# Patient Record
Sex: FEMALE | Race: OTHER | URBAN - METROPOLITAN AREA
[De-identification: names, ages, dates, MRNs, and addresses within clinical notes are randomized per-mention and may not be internally consistent; named-entity substitution may affect disease eponyms.]

---

## 2020-04-05 ENCOUNTER — INPATIENT (INPATIENT)
Facility: HOSPITAL | Age: 65
LOS: 5 days | Discharge: ANOTHER HOSPITAL | End: 2020-04-11
Attending: INTERNAL MEDICINE | Admitting: INTERNAL MEDICINE
Payer: MEDICAID

## 2020-04-05 VITALS
DIASTOLIC BLOOD PRESSURE: 95 MMHG | HEART RATE: 121 BPM | HEIGHT: 72 IN | SYSTOLIC BLOOD PRESSURE: 151 MMHG | OXYGEN SATURATION: 82 % | RESPIRATION RATE: 34 BRPM | WEIGHT: 225.09 LBS | TEMPERATURE: 99 F

## 2020-04-05 DIAGNOSIS — Z29.9 ENCOUNTER FOR PROPHYLACTIC MEASURES, UNSPECIFIED: ICD-10-CM

## 2020-04-05 DIAGNOSIS — J18.9 PNEUMONIA, UNSPECIFIED ORGANISM: ICD-10-CM

## 2020-04-05 LAB
ALBUMIN SERPL ELPH-MCNC: 3.1 G/DL — LOW (ref 3.3–5)
ALP SERPL-CCNC: 89 U/L — SIGNIFICANT CHANGE UP (ref 40–120)
ALT FLD-CCNC: 26 U/L — SIGNIFICANT CHANGE UP (ref 12–78)
ANION GAP SERPL CALC-SCNC: 8 MMOL/L — SIGNIFICANT CHANGE UP (ref 5–17)
APTT BLD: 26.1 SEC — LOW (ref 28.5–37)
AST SERPL-CCNC: 31 U/L — SIGNIFICANT CHANGE UP (ref 15–37)
BASE EXCESS BLDA CALC-SCNC: 0.6 MMOL/L — SIGNIFICANT CHANGE UP (ref -2–2)
BASOPHILS # BLD AUTO: 0.01 K/UL — SIGNIFICANT CHANGE UP (ref 0–0.2)
BASOPHILS NFR BLD AUTO: 0.1 % — SIGNIFICANT CHANGE UP (ref 0–2)
BILIRUB SERPL-MCNC: 0.6 MG/DL — SIGNIFICANT CHANGE UP (ref 0.2–1.2)
BLOOD GAS COMMENTS: SIGNIFICANT CHANGE UP
BLOOD GAS COMMENTS: SIGNIFICANT CHANGE UP
BLOOD GAS SOURCE: SIGNIFICANT CHANGE UP
BUN SERPL-MCNC: 8 MG/DL — SIGNIFICANT CHANGE UP (ref 7–23)
CALCIUM SERPL-MCNC: 8.1 MG/DL — LOW (ref 8.5–10.1)
CHLORIDE SERPL-SCNC: 104 MMOL/L — SIGNIFICANT CHANGE UP (ref 96–108)
CK SERPL-CCNC: 51 U/L — SIGNIFICANT CHANGE UP (ref 26–192)
CO2 SERPL-SCNC: 26 MMOL/L — SIGNIFICANT CHANGE UP (ref 22–31)
CREAT SERPL-MCNC: 0.91 MG/DL — SIGNIFICANT CHANGE UP (ref 0.5–1.3)
D DIMER BLD IA.RAPID-MCNC: 1018 NG/ML DDU — HIGH
EOSINOPHIL # BLD AUTO: 0 K/UL — SIGNIFICANT CHANGE UP (ref 0–0.5)
EOSINOPHIL NFR BLD AUTO: 0 % — SIGNIFICANT CHANGE UP (ref 0–6)
FIBRINOGEN PPP-MCNC: 476 MG/DL — SIGNIFICANT CHANGE UP (ref 350–510)
GLUCOSE SERPL-MCNC: 134 MG/DL — HIGH (ref 70–99)
HCO3 BLDA-SCNC: 24 MMOL/L — SIGNIFICANT CHANGE UP (ref 21–29)
HCT VFR BLD CALC: 45.7 % — HIGH (ref 34.5–45)
HGB BLD-MCNC: 14.9 G/DL — SIGNIFICANT CHANGE UP (ref 11.5–15.5)
HOROWITZ INDEX BLDA+IHG-RTO: 100 — SIGNIFICANT CHANGE UP
IMM GRANULOCYTES NFR BLD AUTO: 0.5 % — SIGNIFICANT CHANGE UP (ref 0–1.5)
INR BLD: 1.16 RATIO — SIGNIFICANT CHANGE UP (ref 0.88–1.16)
LACTATE SERPL-SCNC: 1.6 MMOL/L — SIGNIFICANT CHANGE UP (ref 0.7–2)
LYMPHOCYTES # BLD AUTO: 0.98 K/UL — LOW (ref 1–3.3)
LYMPHOCYTES # BLD AUTO: 11.2 % — LOW (ref 13–44)
MCHC RBC-ENTMCNC: 28.2 PG — SIGNIFICANT CHANGE UP (ref 27–34)
MCHC RBC-ENTMCNC: 32.6 GM/DL — SIGNIFICANT CHANGE UP (ref 32–36)
MCV RBC AUTO: 86.4 FL — SIGNIFICANT CHANGE UP (ref 80–100)
MONOCYTES # BLD AUTO: 0.66 K/UL — SIGNIFICANT CHANGE UP (ref 0–0.9)
MONOCYTES NFR BLD AUTO: 7.5 % — SIGNIFICANT CHANGE UP (ref 2–14)
NEUTROPHILS # BLD AUTO: 7.09 K/UL — SIGNIFICANT CHANGE UP (ref 1.8–7.4)
NEUTROPHILS NFR BLD AUTO: 80.7 % — HIGH (ref 43–77)
NRBC # BLD: 0 /100 WBCS — SIGNIFICANT CHANGE UP (ref 0–0)
PCO2 BLDA: 37 MMHG — SIGNIFICANT CHANGE UP (ref 32–46)
PH BLD: 7.43 — SIGNIFICANT CHANGE UP (ref 7.35–7.45)
PLATELET # BLD AUTO: 212 K/UL — SIGNIFICANT CHANGE UP (ref 150–400)
PO2 BLDA: 229 MMHG — HIGH (ref 74–108)
POTASSIUM SERPL-MCNC: 4.1 MMOL/L — SIGNIFICANT CHANGE UP (ref 3.5–5.3)
POTASSIUM SERPL-SCNC: 4.1 MMOL/L — SIGNIFICANT CHANGE UP (ref 3.5–5.3)
PROT SERPL-MCNC: 7.5 GM/DL — SIGNIFICANT CHANGE UP (ref 6–8.3)
PROTHROM AB SERPL-ACNC: 13.1 SEC — HIGH (ref 10–12.9)
RBC # BLD: 5.29 M/UL — HIGH (ref 3.8–5.2)
RBC # FLD: 13.5 % — SIGNIFICANT CHANGE UP (ref 10.3–14.5)
SAO2 % BLDA: 99 % — HIGH (ref 92–96)
SODIUM SERPL-SCNC: 138 MMOL/L — SIGNIFICANT CHANGE UP (ref 135–145)
WBC # BLD: 8.78 K/UL — SIGNIFICANT CHANGE UP (ref 3.8–10.5)
WBC # FLD AUTO: 8.78 K/UL — SIGNIFICANT CHANGE UP (ref 3.8–10.5)

## 2020-04-05 PROCEDURE — 71045 X-RAY EXAM CHEST 1 VIEW: CPT | Mod: 26

## 2020-04-05 PROCEDURE — 99222 1ST HOSP IP/OBS MODERATE 55: CPT

## 2020-04-05 PROCEDURE — 93010 ELECTROCARDIOGRAM REPORT: CPT

## 2020-04-05 PROCEDURE — 99285 EMERGENCY DEPT VISIT HI MDM: CPT

## 2020-04-05 RX ORDER — HYDROXYCHLOROQUINE SULFATE 200 MG
TABLET ORAL
Refills: 0 | Status: COMPLETED | OUTPATIENT
Start: 2020-04-06 | End: 2020-04-10

## 2020-04-05 RX ORDER — HYDROXYCHLOROQUINE SULFATE 200 MG
200 TABLET ORAL EVERY 12 HOURS
Refills: 0 | Status: COMPLETED | OUTPATIENT
Start: 2020-04-06 | End: 2020-04-10

## 2020-04-05 RX ORDER — CEFTRIAXONE 500 MG/1
1000 INJECTION, POWDER, FOR SOLUTION INTRAMUSCULAR; INTRAVENOUS ONCE
Refills: 0 | Status: COMPLETED | OUTPATIENT
Start: 2020-04-05 | End: 2020-04-05

## 2020-04-05 RX ORDER — ACETAMINOPHEN 500 MG
650 TABLET ORAL EVERY 4 HOURS
Refills: 0 | Status: DISCONTINUED | OUTPATIENT
Start: 2020-04-05 | End: 2020-04-11

## 2020-04-05 RX ORDER — ACETAMINOPHEN 500 MG
1000 TABLET ORAL ONCE
Refills: 0 | Status: COMPLETED | OUTPATIENT
Start: 2020-04-05 | End: 2020-04-05

## 2020-04-05 RX ORDER — HYDROXYCHLOROQUINE SULFATE 200 MG
400 TABLET ORAL EVERY 12 HOURS
Refills: 0 | Status: COMPLETED | OUTPATIENT
Start: 2020-04-05 | End: 2020-04-06

## 2020-04-05 RX ORDER — AZITHROMYCIN 500 MG/1
500 TABLET, FILM COATED ORAL ONCE
Refills: 0 | Status: COMPLETED | OUTPATIENT
Start: 2020-04-05 | End: 2020-04-05

## 2020-04-05 RX ORDER — ENOXAPARIN SODIUM 100 MG/ML
40 INJECTION SUBCUTANEOUS DAILY
Refills: 0 | Status: DISCONTINUED | OUTPATIENT
Start: 2020-04-05 | End: 2020-04-06

## 2020-04-05 RX ADMIN — Medication 1000 MILLIGRAM(S): at 23:06

## 2020-04-05 RX ADMIN — CEFTRIAXONE 100 MILLIGRAM(S): 500 INJECTION, POWDER, FOR SOLUTION INTRAMUSCULAR; INTRAVENOUS at 23:13

## 2020-04-05 RX ADMIN — AZITHROMYCIN 500 MILLIGRAM(S): 500 TABLET, FILM COATED ORAL at 23:13

## 2020-04-05 RX ADMIN — Medication 400 MILLIGRAM(S): at 22:03

## 2020-04-05 NOTE — ED PROVIDER NOTE - PHYSICAL EXAMINATION
Gen: Awake, coughing, speaking in short sentences  Head: NC, AT, EOMI, normal lids/conjunctiva  ENT: normal hearing, patent oropharynx  Neck: supple, no tenderness/meningismus, FROM, Trachea midline  Pulm: deferred, COVID PUI  CV: RRR, +dist pulses  Abd: soft, no TTP, no guarding/rebound tenderness  Mskel: no edema/erythema/cyanosis  Skin: no rash  Neuro: no sensory/motor deficits

## 2020-04-05 NOTE — ED ADULT NURSE NOTE - OBJECTIVE STATEMENT
Received patient in bed 24. AOX4 and [place] speaking to this RN. C/o difficulty breathing with diarrhea X1 week. NRB in place with POX 99%. Awaiting results for dispo

## 2020-04-05 NOTE — H&P ADULT - HISTORY OF PRESENT ILLNESS
Patient is a 65F with no PMH who presents to the ED for dyspnea.  Patient admitted for evaluation for COVID19.  Has had symptoms for   2 weeks.  Symptoms include fever, chills, cough, generalized   weakness and increased dyspnea.  Cough has been productive of    clear sputum.  Denies nausea, vomiting, diarrhea.  Denies prior   testing for COVID19 and denies recent exposure to anyone with   known COVID19.  Nonsmoker, NKDA.  Febrile to101.5 in ED.  Labs   benign.  CXR shows bilateral opacities.  Currently 95% on 15L O2 via   nonrebreather.  Will admit to floor for PNA.

## 2020-04-05 NOTE — ED ADULT TRIAGE NOTE - CHIEF COMPLAINT QUOTE
per daughter, pt  c/o fever, cough, back pain and difficulty breathiing x 1 week per daughter, pt  c/o fever, cough, back pain and difficulty breathing x 1 week

## 2020-04-05 NOTE — ED PROVIDER NOTE - OBJECTIVE STATEMENT
Pertinent PMH/PSH/FHx/SHx and Review of Systems contained within:    66yo F w no significant PMH, s/p appendectomy 3/11/20, presents to ED for eval of SOB, cough & fever.  Pt states she has been feeling sick for about 2wks, then worse over the last week.  Sat 80s RA Pertinent PMH/PSH/FHx/SHx and Review of Systems contained within:    66yo F w no significant PMH, s/p appendectomy 3/11/20, presents to ED for eval of SOB, cough & fever.  Pt states she has been feeling sick for about 2wks, then worse over the last week.  Sat 80s RA.  +diarrhea, dizziness weakness.  No sick contacts at home.    +fever/chills, No photophobia/eye pain/changes in vision, No ear pain/sore throat/dysphagia, +chest pain, +SOB/cough, No abdominal pain, No neck/back pain, no rash, no changes in neurological status/function.

## 2020-04-05 NOTE — H&P ADULT - NSHPPHYSICALEXAM_GEN_ALL_CORE
Physical exam:  General: patient in no acute distress, resting comfortably  Head:  Atraumatic, Normocephalic  Eyes: EOMI, PERRLA, clear sclera  Neck: Supple, thyroid nontender, non enlarged  Cardio: S1/S2 +ve, regular rate and rhythm, no M/G/R  Resp: mild bilateral rales   GI: abdomen soft, nontender, non distended, no guarding, BS +ve x 4  Ext: no significant pedal edema  Neuro: CN 2-12 intact, no significant motor or sensory deficits.  Skin: No rashes or lesions

## 2020-04-06 LAB
CRP SERPL-MCNC: 5.64 MG/DL — HIGH (ref 0–0.4)
CRP SERPL-MCNC: 5.8 MG/DL — HIGH (ref 0–0.4)
D DIMER BLD IA.RAPID-MCNC: 1537 NG/ML DDU — HIGH
FERRITIN SERPL-MCNC: 516 NG/ML — HIGH (ref 15–150)
HCV AB S/CO SERPL IA: 0.18 S/CO — SIGNIFICANT CHANGE UP (ref 0–0.99)
HCV AB SERPL-IMP: SIGNIFICANT CHANGE UP
LDH SERPL L TO P-CCNC: 386 U/L — HIGH (ref 50–242)
LDH SERPL L TO P-CCNC: 414 U/L — HIGH (ref 50–242)
PROCALCITONIN SERPL-MCNC: 0.06 NG/ML — SIGNIFICANT CHANGE UP (ref 0.02–0.1)
SARS-COV-2 RNA SPEC QL NAA+PROBE: DETECTED

## 2020-04-06 PROCEDURE — 99233 SBSQ HOSP IP/OBS HIGH 50: CPT

## 2020-04-06 RX ORDER — ATAZANAVIR 200 MG/1
300 CAPSULE ORAL DAILY
Refills: 0 | Status: DISCONTINUED | OUTPATIENT
Start: 2020-04-07 | End: 2020-04-10

## 2020-04-06 RX ORDER — ENOXAPARIN SODIUM 100 MG/ML
30 INJECTION SUBCUTANEOUS EVERY 12 HOURS
Refills: 0 | Status: DISCONTINUED | OUTPATIENT
Start: 2020-04-06 | End: 2020-04-07

## 2020-04-06 RX ORDER — ATAZANAVIR 200 MG/1
300 CAPSULE ORAL DAILY
Refills: 0 | Status: DISCONTINUED | OUTPATIENT
Start: 2020-04-06 | End: 2020-04-06

## 2020-04-06 RX ADMIN — Medication 40 MILLIGRAM(S): at 17:46

## 2020-04-06 RX ADMIN — Medication 40 MILLIGRAM(S): at 05:42

## 2020-04-06 RX ADMIN — Medication 400 MILLIGRAM(S): at 00:38

## 2020-04-06 RX ADMIN — ENOXAPARIN SODIUM 30 MILLIGRAM(S): 100 INJECTION SUBCUTANEOUS at 17:46

## 2020-04-06 RX ADMIN — ATAZANAVIR 300 MILLIGRAM(S): 200 CAPSULE ORAL at 17:45

## 2020-04-06 RX ADMIN — Medication 40 MILLIGRAM(S): at 00:38

## 2020-04-06 RX ADMIN — ENOXAPARIN SODIUM 30 MILLIGRAM(S): 100 INJECTION SUBCUTANEOUS at 05:42

## 2020-04-06 RX ADMIN — Medication 400 MILLIGRAM(S): at 05:42

## 2020-04-06 RX ADMIN — CEFTRIAXONE 1000 MILLIGRAM(S): 500 INJECTION, POWDER, FOR SOLUTION INTRAMUSCULAR; INTRAVENOUS at 00:27

## 2020-04-06 RX ADMIN — Medication 200 MILLIGRAM(S): at 17:45

## 2020-04-06 NOTE — PROGRESS NOTE ADULT - ASSESSMENT
Patient was presented with cough, fever, shortness of breath and achiness. Patient was diagnosed with acute hypoxic respiratory failure with acute COVID-19 infection and bilateral pneumonia. Patient was admitted and was started on plaquenil and iv solumedrol. Patient was placed on %.     1.	Acute hypoxic respiratory failue with acute COVID-19 bilateral pneumonia. cont NRB oxygen. start plaquenil. Follow inflammatory markers. cont iv solumedrol. check oxygen sat with continuous pulse ox. EKG reviewed by me showed QTc 453 msec.   2.	Obesity. outpatient PCP follow up. at risk for DM type 2.     Lovenox  Full code

## 2020-04-06 NOTE — PROGRESS NOTE ADULT - SUBJECTIVE AND OBJECTIVE BOX
CHIEF COMPLAINT: + SOB and requiring 100% NRB oxygen   + achiness   + cough and no diarrhea       PHYSICAL EXAM:    GENERAL: obese, on NRB oxygen   CHEST/LUNG: few crackles bibasally, no wheezing, no crackles   HEART: Regular rate and rhythm; No murmurs, rubs  ABDOMEN: Soft, Nontender, Nondistended; Bowel sounds present  EXTREMITIES:  Moving all four extremities spontaneously, No clubbing, cyanosis, or edema  NERVOUS SYSTEM:  Grossly non focal.  Psychiatry: AAO x 3, mood is appropriate       OBJECTIVE DATA:   Vital Signs Last 24 Hrs  T(C): 36.1 (06 Apr 2020 13:27), Max: 38.6 (05 Apr 2020 21:17)  T(F): 97 (06 Apr 2020 13:27), Max: 101.5 (05 Apr 2020 21:17)  HR: 90 (06 Apr 2020 13:27) (90 - 121)  BP: 132/80 (06 Apr 2020 13:27) (121/61 - 155/92)  BP(mean): --  RR: 24 (06 Apr 2020 13:27) (18 - 34)  SpO2: 99% (06 Apr 2020 13:27) (82% - 100%)           Daily Height in cm: 157.48 (06 Apr 2020 06:27)    Daily   LABS:                        14.9   8.78  )-----------( 212      ( 05 Apr 2020 22:02 )             45.7             04-05    138  |  104  |  8   ----------------------------<  134<H>  4.1   |  26  |  0.91    Ca    8.1<L>      05 Apr 2020 22:02    TPro  7.5  /  Alb  3.1<L>  /  TBili  0.6  /  DBili  x   /  AST  31  /  ALT  26  /  AlkPhos  89  04-05              PT/INR - ( 05 Apr 2020 22:02 )   PT: 13.1 sec;   INR: 1.16 ratio         PTT - ( 05 Apr 2020 22:02 )  PTT:26.1 sec     ABG - ( 05 Apr 2020 22:43 )  pH, Arterial: x     pH, Blood: 7.43  /  pCO2: 37    /  pO2: 229   / HCO3: 24    / Base Excess: 0.6   /  SaO2: 99               CARDIAC MARKERS ( 05 Apr 2020 22:02 )  x     / x     / 51 U/L / x     / x             Interval Radiology studies: reviewed by me  < from: Xray Chest 1 View- PORTABLE-Urgent (04.05.20 @ 22:47) >  Bilateral airspace opacities suspicious for pneumonia.      MEDICATIONS  (STANDING):  enoxaparin Injectable 30 milliGRAM(s) SubCutaneous every 12 hours  hydroxychloroquine   Oral   hydroxychloroquine 200 milliGRAM(s) Oral every 12 hours  methylPREDNISolone sodium succinate Injectable 40 milliGRAM(s) IV Push every 12 hours    MEDICATIONS  (PRN):  acetaminophen   Tablet .. 650 milliGRAM(s) Oral every 4 hours PRN Temp greater or equal to 38.5C (101.3F)

## 2020-04-07 LAB
CRP SERPL-MCNC: 4.02 MG/DL — HIGH (ref 0–0.4)
FERRITIN SERPL-MCNC: 442 NG/ML — HIGH (ref 15–150)
FERRITIN SERPL-MCNC: 480 NG/ML — HIGH (ref 15–150)
LDH SERPL L TO P-CCNC: 321 U/L — HIGH (ref 50–242)

## 2020-04-07 PROCEDURE — 99233 SBSQ HOSP IP/OBS HIGH 50: CPT

## 2020-04-07 PROCEDURE — 93010 ELECTROCARDIOGRAM REPORT: CPT

## 2020-04-07 RX ORDER — ENOXAPARIN SODIUM 100 MG/ML
40 INJECTION SUBCUTANEOUS EVERY 12 HOURS
Refills: 0 | Status: DISCONTINUED | OUTPATIENT
Start: 2020-04-07 | End: 2020-04-11

## 2020-04-07 RX ORDER — PANTOPRAZOLE SODIUM 20 MG/1
40 TABLET, DELAYED RELEASE ORAL
Refills: 0 | Status: DISCONTINUED | OUTPATIENT
Start: 2020-04-07 | End: 2020-04-11

## 2020-04-07 RX ADMIN — Medication 200 MILLIGRAM(S): at 05:09

## 2020-04-07 RX ADMIN — Medication 40 MILLIGRAM(S): at 05:09

## 2020-04-07 RX ADMIN — Medication 200 MILLIGRAM(S): at 17:29

## 2020-04-07 RX ADMIN — ENOXAPARIN SODIUM 40 MILLIGRAM(S): 100 INJECTION SUBCUTANEOUS at 17:29

## 2020-04-07 RX ADMIN — Medication 40 MILLIGRAM(S): at 17:28

## 2020-04-07 RX ADMIN — ENOXAPARIN SODIUM 30 MILLIGRAM(S): 100 INJECTION SUBCUTANEOUS at 05:08

## 2020-04-07 NOTE — PROGRESS NOTE ADULT - SUBJECTIVE AND OBJECTIVE BOX
CHIEF COMPLAINT: Hypoxic on nasal canula oxygen and placed on NRB again   + achiness         PHYSICAL EXAM:    GENERAL: obese, on NRB oxygen   CHEST/LUNG: few crackles bibasally, no wheezing, no crackles   HEART: Regular rate and rhythm; No murmurs, rubs  ABDOMEN: Soft, Nontender, Nondistended; Bowel sounds present  EXTREMITIES:  Moving all four extremities spontaneously, No clubbing, cyanosis, or edema  NERVOUS SYSTEM:  Grossly non focal.  Psychiatry: AAO x 3, mood is appropriate       OBJECTIVE DATA:     Vital Signs Last 24 Hrs  T(C): 35.9 (07 Apr 2020 05:20), Max: 36.8 (06 Apr 2020 17:41)  T(F): 96.7 (07 Apr 2020 05:20), Max: 98.2 (06 Apr 2020 17:41)  HR: 82 (07 Apr 2020 05:20) (82 - 90)  BP: 118/77 (07 Apr 2020 05:20) (118/77 - 132/80)  BP(mean): --  RR: 18 (07 Apr 2020 05:20) (18 - 24)  SpO2: 99% (07 Apr 2020 05:20) (97% - 99%)           Daily     Daily   LABS:                        14.9   8.78  )-----------( 212      ( 05 Apr 2020 22:02 )             45.7             04-05    138  |  104  |  8   ----------------------------<  134<H>  4.1   |  26  |  0.91    Ca    8.1<L>      05 Apr 2020 22:02    TPro  7.5  /  Alb  3.1<L>  /  TBili  0.6  /  DBili  x   /  AST  31  /  ALT  26  /  AlkPhos  89  04-05              PT/INR - ( 05 Apr 2020 22:02 )   PT: 13.1 sec;   INR: 1.16 ratio         PTT - ( 05 Apr 2020 22:02 )  PTT:26.1 sec    ABG - ( 05 Apr 2020 22:43 )  pH, Arterial: x     pH, Blood: 7.43  /  pCO2: 37    /  pO2: 229   / HCO3: 24    / Base Excess: 0.6   /  SaO2: 99               CARDIAC MARKERS ( 05 Apr 2020 22:02 )  x     / x     / 51 U/L / x     / x          CAPILLARY BLOOD GLUCOSE          Culture - Blood (collected 04-06)  Source: .Blood Blood  Preliminary Report (04-07):    No growth to date.    Culture - Blood (collected 04-06)  Source: .Blood Blood  Preliminary Report (04-07):    No growth to date.      MEDICATIONS  (STANDING):  atazanavir 300 milliGRAM(s) Oral daily  enoxaparin Injectable 40 milliGRAM(s) SubCutaneous every 12 hours  hydroxychloroquine   Oral   hydroxychloroquine 200 milliGRAM(s) Oral every 12 hours  methylPREDNISolone sodium succinate Injectable 40 milliGRAM(s) IV Push every 12 hours    MEDICATIONS  (PRN):  acetaminophen   Tablet .. 650 milliGRAM(s) Oral every 4 hours PRN Temp greater or equal to 38.5C (101.3F)

## 2020-04-07 NOTE — PROGRESS NOTE ADULT - ASSESSMENT
Patient was presented with cough, fever, shortness of breath and achiness. Patient was diagnosed with acute hypoxic respiratory failure with acute COVID-19 infection and bilateral pneumonia. Patient was admitted and was started on plaquenil and iv solumedrol. Patient was placed on %.     1.	Acute hypoxic respiratory failue with acute COVID-19 bilateral pneumonia. still hypoxic requiring NRB oxygen. Cont plaquenil. Follow inflammatory markers. cont iv solumedrol. check oxygen sat with continuous pulse ox. discussed with nurse and patient's family on phone.   2.	Obesity. outpatient PCP follow up. At risk for DM type 2.     Lovenox  Full code       Time spent by me 37 mins

## 2020-04-08 LAB
CRP SERPL-MCNC: 1.88 MG/DL — HIGH (ref 0–0.4)
FERRITIN SERPL-MCNC: 431 NG/ML — HIGH (ref 15–150)
LDH SERPL L TO P-CCNC: 347 U/L — HIGH (ref 50–242)

## 2020-04-08 PROCEDURE — 99233 SBSQ HOSP IP/OBS HIGH 50: CPT

## 2020-04-08 RX ADMIN — Medication 40 MILLIGRAM(S): at 17:18

## 2020-04-08 RX ADMIN — ENOXAPARIN SODIUM 40 MILLIGRAM(S): 100 INJECTION SUBCUTANEOUS at 17:18

## 2020-04-08 RX ADMIN — PANTOPRAZOLE SODIUM 40 MILLIGRAM(S): 20 TABLET, DELAYED RELEASE ORAL at 06:24

## 2020-04-08 RX ADMIN — ENOXAPARIN SODIUM 40 MILLIGRAM(S): 100 INJECTION SUBCUTANEOUS at 05:21

## 2020-04-08 RX ADMIN — Medication 200 MILLIGRAM(S): at 17:18

## 2020-04-08 RX ADMIN — ATAZANAVIR 300 MILLIGRAM(S): 200 CAPSULE ORAL at 11:53

## 2020-04-08 RX ADMIN — Medication 200 MILLIGRAM(S): at 05:21

## 2020-04-08 RX ADMIN — Medication 40 MILLIGRAM(S): at 05:21

## 2020-04-08 NOTE — PROGRESS NOTE ADULT - ASSESSMENT
Patient was presented with cough, fever, shortness of breath and achiness. Patient was diagnosed with acute hypoxic respiratory failure with acute COVID-19 infection and bilateral pneumonia. Patient was admitted and was started on plaquenil and iv solumedrol. Patient was placed on %.     1.	Acute hypoxic respiratory failue with acute COVID-19 bilateral pneumonia. still hypoxic requiring NRB oxygen but work of breathing improving. eating well Cont plaquenil. add reyataz.  Follow inflammatory markers. cont iv solumedrol.       Lovenox  Full code

## 2020-04-08 NOTE — PROGRESS NOTE ADULT - SUBJECTIVE AND OBJECTIVE BOX
Patient is a 65y old  Female who presents with a chief complaint of dyspnea (07 Apr 2020 09:57)      INTERVAL HPI/OVERNIGHT EVENTS: work of breathing improving but still on NRB. no events     MEDICATIONS  (STANDING):  atazanavir 300 milliGRAM(s) Oral daily  enoxaparin Injectable 40 milliGRAM(s) SubCutaneous every 12 hours  hydroxychloroquine   Oral   hydroxychloroquine 200 milliGRAM(s) Oral every 12 hours  methylPREDNISolone sodium succinate Injectable 40 milliGRAM(s) IV Push every 12 hours  pantoprazole    Tablet 40 milliGRAM(s) Oral before breakfast    MEDICATIONS  (PRN):  acetaminophen   Tablet .. 650 milliGRAM(s) Oral every 4 hours PRN Temp greater or equal to 38.5C (101.3F)      Allergies    No Known Allergies    Intolerances      Vital Signs Last 24 Hrs  T(C): 37.1 (08 Apr 2020 12:06), Max: 37.1 (08 Apr 2020 12:06)  T(F): 98.8 (08 Apr 2020 12:06), Max: 98.8 (08 Apr 2020 12:06)  HR: 78 (08 Apr 2020 12:06) (77 - 90)  BP: 128/69 (08 Apr 2020 12:06) (126/80 - 147/79)  BP(mean): --  RR: 20 (08 Apr 2020 12:06) (18 - 20)  SpO2: 96% (08 Apr 2020 12:06) (96% - 100%)    PHYSICAL EXAM:  GENERAL: NAD, well-groomed, well-developed  HEAD:  Atraumatic, Normocephalic  EYES: EOMI, PERRLA, conjunctiva and sclera clear  ENMT: No tonsillar erythema, exudates, or enlargement; Moist mucous membranes, Good dentition, No lesions  NECK: Supple, No JVD, Normal thyroid  NERVOUS SYSTEM:  Alert & Oriented X3, Good concentration; Motor Strength 5/5 B/L upper and lower extremities; DTRs 2+ intact and symmetric  CHEST/LUNG: Clear to percussion bilaterally; No rales, rhonchi, wheezing, or rubs  HEART: Regular rate and rhythm; No murmurs, rubs, or gallops  ABDOMEN: Soft, Nontender, Nondistended; Bowel sounds present  EXTREMITIES:  2+ Peripheral Pulses, No clubbing, cyanosis, or edema  LYMPH: No lymphadenopathy noted  SKIN: No rashes or lesions    LABS:              CAPILLARY BLOOD GLUCOSE          RADIOLOGY & ADDITIONAL TESTS:    Imaging Personally Reviewed:  [ ] YES  [ ] NO    Consultant(s) Notes Reviewed:  [ ] YES  [ ] NO    Care Discussed with Consultants/Other Providers [ ] YES  [ ] NO

## 2020-04-09 LAB
ANION GAP SERPL CALC-SCNC: 7 MMOL/L — SIGNIFICANT CHANGE UP (ref 5–17)
BUN SERPL-MCNC: 26 MG/DL — HIGH (ref 7–23)
CALCIUM SERPL-MCNC: 8.3 MG/DL — LOW (ref 8.5–10.1)
CHLORIDE SERPL-SCNC: 103 MMOL/L — SIGNIFICANT CHANGE UP (ref 96–108)
CO2 SERPL-SCNC: 27 MMOL/L — SIGNIFICANT CHANGE UP (ref 22–31)
CREAT SERPL-MCNC: 0.76 MG/DL — SIGNIFICANT CHANGE UP (ref 0.5–1.3)
CRP SERPL-MCNC: 0.91 MG/DL — HIGH (ref 0–0.4)
D DIMER BLD IA.RAPID-MCNC: 1438 NG/ML DDU — HIGH
FERRITIN SERPL-MCNC: 485 NG/ML — HIGH (ref 15–150)
GLUCOSE SERPL-MCNC: 189 MG/DL — HIGH (ref 70–99)
HCT VFR BLD CALC: 43.6 % — SIGNIFICANT CHANGE UP (ref 34.5–45)
HGB BLD-MCNC: 14.3 G/DL — SIGNIFICANT CHANGE UP (ref 11.5–15.5)
LDH SERPL L TO P-CCNC: 604 U/L — HIGH (ref 50–242)
MCHC RBC-ENTMCNC: 28.4 PG — SIGNIFICANT CHANGE UP (ref 27–34)
MCHC RBC-ENTMCNC: 32.8 GM/DL — SIGNIFICANT CHANGE UP (ref 32–36)
MCV RBC AUTO: 86.7 FL — SIGNIFICANT CHANGE UP (ref 80–100)
NRBC # BLD: 0 /100 WBCS — SIGNIFICANT CHANGE UP (ref 0–0)
PLATELET # BLD AUTO: 256 K/UL — SIGNIFICANT CHANGE UP (ref 150–400)
POTASSIUM SERPL-MCNC: 4.2 MMOL/L — SIGNIFICANT CHANGE UP (ref 3.5–5.3)
POTASSIUM SERPL-SCNC: 4.2 MMOL/L — SIGNIFICANT CHANGE UP (ref 3.5–5.3)
RBC # BLD: 5.03 M/UL — SIGNIFICANT CHANGE UP (ref 3.8–5.2)
RBC # FLD: 12.7 % — SIGNIFICANT CHANGE UP (ref 10.3–14.5)
SODIUM SERPL-SCNC: 137 MMOL/L — SIGNIFICANT CHANGE UP (ref 135–145)
WBC # BLD: 11.05 K/UL — HIGH (ref 3.8–10.5)
WBC # FLD AUTO: 11.05 K/UL — HIGH (ref 3.8–10.5)

## 2020-04-09 PROCEDURE — 99232 SBSQ HOSP IP/OBS MODERATE 35: CPT

## 2020-04-09 RX ADMIN — PANTOPRAZOLE SODIUM 40 MILLIGRAM(S): 20 TABLET, DELAYED RELEASE ORAL at 05:48

## 2020-04-09 RX ADMIN — ENOXAPARIN SODIUM 40 MILLIGRAM(S): 100 INJECTION SUBCUTANEOUS at 17:16

## 2020-04-09 RX ADMIN — Medication 200 MILLIGRAM(S): at 17:16

## 2020-04-09 RX ADMIN — Medication 40 MILLIGRAM(S): at 05:48

## 2020-04-09 RX ADMIN — Medication 200 MILLIGRAM(S): at 05:48

## 2020-04-09 RX ADMIN — ATAZANAVIR 300 MILLIGRAM(S): 200 CAPSULE ORAL at 12:56

## 2020-04-09 RX ADMIN — ENOXAPARIN SODIUM 40 MILLIGRAM(S): 100 INJECTION SUBCUTANEOUS at 05:47

## 2020-04-09 NOTE — DISCHARGE NOTE PROVIDER - NSDCCPCAREPLAN_GEN_ALL_CORE_FT
PRINCIPAL DISCHARGE DIAGNOSIS  Diagnosis: Pneumonia  Assessment and Plan of Treatment: You were tested positive for COVID- 19.  For up to date information information on COVID -19, you may contact the Department of Health Hotline at [phone]. Tylenol 650mg tablet twice a day for fever and you can also apply cool compress for fever. You can take Aleve 2 pills twice a day with food for bodyaches .  Perform frequent good hand hygiene by washing your hands with soap and hot water as can tolerate but not to burn your hands.  Disinfect your house doorknobs, steering wheels, refrigerator door handles.  If your symptoms worsen, fever, chills, chest pain or sob, call your doctor.  Call 911 for severe shortness of breath or get to your nearest Emergency Department and inform the staff or 911 that you were tested positive for COVID-19.  Continue to self quarantine and wear a mask so as to not infect others.  Drink lots of fluids.        SECONDARY DISCHARGE DIAGNOSES  Diagnosis: Hypoxia  Assessment and Plan of Treatment: Make sure to follow up with your doctor.  Report any new symptoms of increasing shortness of breath or chest pain to your doctor.  Report to your nearest Emergency Department or call 911 if worsening shortness of breath and inform 911 that your were tested Positive for COVID-19.

## 2020-04-09 NOTE — PROGRESS NOTE ADULT - SUBJECTIVE AND OBJECTIVE BOX
INTERVAL HPI/OVERNIGHT EVENTS:  Pt seen and examined at bedside.     Allergies/Intolerance: No Known Allergies      MEDICATIONS  (STANDING):  atazanavir 300 milliGRAM(s) Oral daily  enoxaparin Injectable 40 milliGRAM(s) SubCutaneous every 12 hours  hydroxychloroquine   Oral   hydroxychloroquine 200 milliGRAM(s) Oral every 12 hours  methylPREDNISolone sodium succinate Injectable 40 milliGRAM(s) IV Push every 12 hours  pantoprazole    Tablet 40 milliGRAM(s) Oral before breakfast    MEDICATIONS  (PRN):  acetaminophen   Tablet .. 650 milliGRAM(s) Oral every 4 hours PRN Temp greater or equal to 38.5C (101.3F)        ROS: all systems reviewed and wnl      PHYSICAL EXAMINATION:  Vital Signs Last 24 Hrs  T(C): 36.6 (09 Apr 2020 05:35), Max: 37.1 (08 Apr 2020 12:06)  T(F): 97.9 (09 Apr 2020 05:35), Max: 98.8 (08 Apr 2020 12:06)  HR: 71 (09 Apr 2020 05:35) (71 - 97)  BP: 154/90 (09 Apr 2020 05:35) (128/69 - 163/93)  BP(mean): --  RR: 18 (09 Apr 2020 05:35) (18 - 20)  SpO2: 98% (09 Apr 2020 05:35) (94% - 98%)  CAPILLARY BLOOD GLUCOSE            GENERAL:   NECK: supple, No JVD  CHEST/LUNG: clear to auscultation bilaterally; no rales, rhonchi, or wheezing b/l  HEART: normal S1, S2  ABDOMEN: BS+, soft, ND, NT   EXTREMITIES:  pulses palpable; no clubbing, cyanosis, or edema b/l LEs  SKIN: no rashes or lesions      LABS:                        14.3   11.05 )-----------( 256      ( 09 Apr 2020 08:19 )             43.6     04-09    137  |  103  |  26<H>  ----------------------------<  189<H>  4.2   |  27  |  0.76    Ca    8.3<L>      09 Apr 2020 08:19 INTERVAL HPI/OVERNIGHT EVENTS:  Pt seen and examined at bedside.     Allergies/Intolerance: No Known Allergies      MEDICATIONS  (STANDING):  atazanavir 300 milliGRAM(s) Oral daily  enoxaparin Injectable 40 milliGRAM(s) SubCutaneous every 12 hours  hydroxychloroquine   Oral   hydroxychloroquine 200 milliGRAM(s) Oral every 12 hours  methylPREDNISolone sodium succinate Injectable 40 milliGRAM(s) IV Push every 12 hours  pantoprazole    Tablet 40 milliGRAM(s) Oral before breakfast    MEDICATIONS  (PRN):  acetaminophen   Tablet .. 650 milliGRAM(s) Oral every 4 hours PRN Temp greater or equal to 38.5C (101.3F)        ROS: all systems reviewed and wnl      PHYSICAL EXAMINATION:  Vital Signs Last 24 Hrs  T(C): 36.6 (09 Apr 2020 05:35), Max: 37.1 (08 Apr 2020 12:06)  T(F): 97.9 (09 Apr 2020 05:35), Max: 98.8 (08 Apr 2020 12:06)  HR: 71 (09 Apr 2020 05:35) (71 - 97)  BP: 154/90 (09 Apr 2020 05:35) (128/69 - 163/93)  BP(mean): --  RR: 18 (09 Apr 2020 05:35) (18 - 20)  SpO2: 98% (09 Apr 2020 05:35) (94% - 98%)  CAPILLARY BLOOD GLUCOSE            GENERAL: comfortable on 100 % NRB, no CP or SOB  NECK: supple, No JVD  CHEST/LUNG: clear to auscultation bilaterally; no rales, rhonchi, or wheezing b/l  HEART: normal S1, S2  ABDOMEN: BS+, soft, ND, NT   EXTREMITIES:  pulses palpable; no clubbing, cyanosis, or edema b/l LEs  SKIN: no rashes or lesions      LABS:                        14.3   11.05 )-----------( 256      ( 09 Apr 2020 08:19 )             43.6     04-09    137  |  103  |  26<H>  ----------------------------<  189<H>  4.2   |  27  |  0.76    Ca    8.3<L>      09 Apr 2020 08:19

## 2020-04-09 NOTE — DISCHARGE NOTE PROVIDER - CARE PROVIDER_API CALL
Nathaniel Soto)  Internal Medicine  [address] [place] [address]  Phone: [phone]  Fax: [phone]  Follow Up Time:

## 2020-04-09 NOTE — DISCHARGE NOTE PROVIDER - HOSPITAL COURSE
Patient is a 65 female no sign pmhx presented with cough, fever, shortness of breath and achiness. Patient was diagnosed with acute hypoxic respiratory failure with acute COVID-19 infection and bilateral pneumonia.   Patient was subsequently admitted and treated with Plaquenil, iv solumedrol and  Reyataz 300mg daily for 4 days.          Pt remained stable and will slowly wean of NR.  Lovenox DVT prevention 40 mg bid. Patient is a 65 female no sign pmhx presented with cough, fever, shortness of breath and achiness. Patient was diagnosed with acute hypoxic respiratory failure with acute COVID-19 infection and bilateral pneumonia.   Patient was subsequently admitted and treated with Plaquenil, iv solumedrol and  Reyataz 300mg daily for 4 days.          Pt remained stable and will slowly wean of NR.  Lovenox DVT prevention 40 mg bid. Discharge to [facility] on 100% NRB.         Ferritin, Serum (04.10.20 @ 13:47)      Ferritin, Serum: 511 ng/mL    C-Reactive Protein, Serum (04.10.20 @ 13:47)      C-Reactive Protein, Serum: 0.50 mg/dL    Lactate Dehydrogenase, Serum (04.10.20 @ 13:44)      Lactate Dehydrogenase, Serum: 398 U/L    Complete Blood Count in AM (04.09.20 @ 08:19)      Nucleated RBC: 0 /100 WBCs      WBC Count: 11.05 K/uL      RBC Count: 5.03 M/uL      Hemoglobin: 14.3 g/dL      Hematocrit: 43.6 %      Mean Cell Volume: 86.7 fl      Mean Cell Hemoglobin: 28.4 pg      Mean Cell Hemoglobin Conc: 32.8 gm/dL      Red Cell Distrib Width: 12.7 %      Platelet Count - Automated: 256 K/uL    Basic Metabolic Panel in AM (04.09.20 @ 08:19)      Sodium, Serum: 137 mmol/L      Potassium, Serum: 4.2 mmol/L      Chloride, Serum: 103 mmol/L      Carbon Dioxide, Serum: 27 mmol/L      Anion Gap, Serum: 7 mmol/L      Blood Urea Nitrogen, Serum: 26 mg/dL      Creatinine, Serum: 0.76 mg/dL      Glucose, Serum: 189 mg/dL      Calcium, Total Serum: 8.3 mg/dL      eGFR if Non : 83: Interpretative comment    The units for eGFR are mL/min/1.73M2 (normalized body surface area). The    eGFR is calculated from a serum creatinine using the CKD-EPI equation.    Other variables required for calculation are race, age and sex. Among    patients with chronic kidney disease (CKD), the eGFR is useful in    determining the stage of disease according to KDOQI CKD classification.    All eGFR results are reported numerically with the following    interpretation.            GFR                    With                 Without       (ml/min/1.73 m2)    Kidney Damage       Kidney Damage          >= 90                    Stage 1                     Normal          60-89                    Stage 2                     Decreased GFR          30-59     Stage 3                     Stage 3          15-29                    Stage 4                     Stage 4          < 15                      Stage 5                     Stage 5    Each stage of CKD assumes that the associated GFR level has been in    effect for at least 3 months. Determination of stages one and two (with    eGFR > 59 ml/min/m2) requires estimation of kidney damage for at least 3    months as defined by structural or functional abnormalities.    Limitations: All estimates of GFR will be less accurate for patients at    extremes of muscle mass (including but not limited to frail elderly,    critically ill, or cancer patients), those with unusual diets, and those    with conditions associated with reduced secretion or extrarenal    elimination of creatinine. The eGFR equation is not recommended for use    in patients with unstable creatinine levels. mL/min/1.73M2      eGFR if : 96 mL/min/1.73M2 Patient is a 65 female no sign pmhx presented with cough, fever, shortness of breath and achiness. Patient was diagnosed with acute hypoxic respiratory failure with acute COVID-19 infection and bilateral pneumonia.   Patient was subsequently admitted and treated with Plaquenil, iv solumedrol and  Reyataz 300mg daily for 4 days.              Ferritin, Serum (04.10.20 @ 13:47)      Ferritin, Serum: 511 ng/mL    C-Reactive Protein, Serum (04.10.20 @ 13:47)      C-Reactive Protein, Serum: 0.50 mg/dL    Lactate Dehydrogenase, Serum (04.10.20 @ 13:44)      Lactate Dehydrogenase, Serum: 398 U/L    Complete Blood Count in AM (04.09.20 @ 08:19)      Nucleated RBC: 0 /100 WBCs      WBC Count: 11.05 K/uL      RBC Count: 5.03 M/uL      Hemoglobin: 14.3 g/dL      Hematocrit: 43.6 %      Mean Cell Volume: 86.7 fl      Mean Cell Hemoglobin: 28.4 pg      Mean Cell Hemoglobin Conc: 32.8 gm/dL      Red Cell Distrib Width: 12.7 %      Platelet Count - Automated: 256 K/uL    Basic Metabolic Panel in AM (04.09.20 @ 08:19)      Sodium, Serum: 137 mmol/L      Potassium, Serum: 4.2 mmol/L      Chloride, Serum: 103 mmol/L      Carbon Dioxide, Serum: 27 mmol/L      Anion Gap, Serum: 7 mmol/L      Blood Urea Nitrogen, Serum: 26 mg/dL      Creatinine, Serum: 0.76 mg/dL      Glucose, Serum: 189 mg/dL      Calcium, Total Serum: 8.3 mg/dL      eGFR if Non : 83: Interpretative comment                Pt remained stable and will slowly wean of NR.  Lovenox DVT prevention 40 mg bid.     Patient was placed on %.         1.	Acute hypoxic respiratory failue with acute COVID-19 bilateral pneumonia. Slowly improving. Eating well. Completed Plaquenil and Reyataz. Completed IV  Solumedrol.      2.	    3.	Slowly try to wean off 100 % NRB. Out of bed.  Lovenox DVT prevention 40 mg bid.  Full code.          Discharge to [facility] on 100% NRB.     Discharge home when fully off oxygen.

## 2020-04-09 NOTE — PROGRESS NOTE ADULT - ASSESSMENT
Patient was presented with cough, fever, shortness of breath and achiness. Patient was diagnosed with acute hypoxic respiratory failure with acute COVID-19 infection and bilateral pneumonia. Patient was admitted and was started on plaquenil and iv solumedrol. Patient was placed on %.     1.	Acute hypoxic respiratory failue with acute COVID-19 bilateral pneumonia. still hypoxic requiring NRB oxygen but work of breathing improving. eating well Cont plaquenil. add reyataz.  Follow inflammatory markers. cont iv solumedrol.       Lovenox  Full code Patient was presented with cough, fever, shortness of breath and achiness. Patient was diagnosed with acute hypoxic respiratory failure with acute COVID-19 infection and bilateral pneumonia. Patient was admitted and was started on plaquenil and iv solumedrol. Patient was placed on %.     1.	Acute hypoxic respiratory failue with acute COVID-19 bilateral pneumonia. still hypoxic requiring NRB oxygen but work of breathing improving. eating well Cont plaquenil. add reyataz.  Follow inflammatory markers. cont iv Solumedrol 40 mg bid. Slowly try to wean off 100 % NRB.        Lovenox  Full code Patient was presented with cough, fever, shortness of breath and achiness. Patient was diagnosed with acute hypoxic respiratory failure with acute COVID-19 infection and bilateral pneumonia. Patient was admitted and was started on Plaquenil and iv solumedrol. Patient was placed on %.     1.	Acute hypoxic respiratory failue with acute COVID-19 bilateral pneumonia. Still hypoxic requiring NRB oxygen but work of breathing improving. eating well Cont Plaquenil to complete course, added Reyataz 300 mg/day for 4 days. Follow inflammatory markers. Will stop Solumedrol, completed three days. Slowly try to wean off 100 % NRB. Out of bed.  Lovenox DVT prevention 40 mg bid.  Full code.

## 2020-04-09 NOTE — DISCHARGE NOTE PROVIDER - NSDCMRMEDTOKEN_GEN_ALL_CORE_FT
acetaminophen 325 mg oral tablet: 2 tab(s) orally every 4 hours, As needed, Temp greater or equal to 38.5C (101.3F)  enoxaparin: 40 milligram(s) subcutaneous 12 times a day  pantoprazole 40 mg oral delayed release tablet: 1 tab(s) orally once a day (before a meal)

## 2020-04-10 LAB
CRP SERPL-MCNC: 0.5 MG/DL — HIGH (ref 0–0.4)
CULTURE RESULTS: SIGNIFICANT CHANGE UP
CULTURE RESULTS: SIGNIFICANT CHANGE UP
FERRITIN SERPL-MCNC: 511 NG/ML — HIGH (ref 15–150)
LDH SERPL L TO P-CCNC: 398 U/L — HIGH (ref 50–242)
SPECIMEN SOURCE: SIGNIFICANT CHANGE UP
SPECIMEN SOURCE: SIGNIFICANT CHANGE UP

## 2020-04-10 PROCEDURE — 99239 HOSP IP/OBS DSCHRG MGMT >30: CPT

## 2020-04-10 RX ORDER — ACETAMINOPHEN 500 MG
2 TABLET ORAL
Qty: 0 | Refills: 0 | DISCHARGE
Start: 2020-04-10

## 2020-04-10 RX ORDER — ENOXAPARIN SODIUM 100 MG/ML
40 INJECTION SUBCUTANEOUS
Qty: 0 | Refills: 0 | DISCHARGE
Start: 2020-04-10

## 2020-04-10 RX ORDER — PANTOPRAZOLE SODIUM 20 MG/1
1 TABLET, DELAYED RELEASE ORAL
Qty: 0 | Refills: 0 | DISCHARGE
Start: 2020-04-10

## 2020-04-10 RX ADMIN — ENOXAPARIN SODIUM 40 MILLIGRAM(S): 100 INJECTION SUBCUTANEOUS at 17:17

## 2020-04-10 RX ADMIN — Medication 200 MILLIGRAM(S): at 05:32

## 2020-04-10 RX ADMIN — ENOXAPARIN SODIUM 40 MILLIGRAM(S): 100 INJECTION SUBCUTANEOUS at 05:32

## 2020-04-10 RX ADMIN — PANTOPRAZOLE SODIUM 40 MILLIGRAM(S): 20 TABLET, DELAYED RELEASE ORAL at 06:22

## 2020-04-10 NOTE — CHART NOTE - NSCHARTNOTEFT_GEN_A_CORE
Medicine Hospitalist PA    Patient is a 64 y/o female admitted for hypoxic respiratory failure 2/2 to covid pneumonia. Patient scheduled for transfer to [facility] today. However upon arrival of ambulance, patient noted to be on NRB+10LNC with spo2 85%. Per qualifications for transfer to [facility], patient may not be on more than 6LNC with NRB and spo2 >93%. Ambulance left secondary to failed qualifications. Will attempt to titrate o2 requirements as tolerated. Encourage self proning. Informed nursing manager and attending on call. Patient will be transferred to .

## 2020-04-10 NOTE — PROGRESS NOTE ADULT - SUBJECTIVE AND OBJECTIVE BOX
INTERVAL HPI/OVERNIGHT EVENTS:  Pt seen and examined at bedside.     Allergies/Intolerance: No Known Allergies      MEDICATIONS  (STANDING):  atazanavir 300 milliGRAM(s) Oral daily  enoxaparin Injectable 40 milliGRAM(s) SubCutaneous every 12 hours  pantoprazole    Tablet 40 milliGRAM(s) Oral before breakfast    MEDICATIONS  (PRN):  acetaminophen   Tablet .. 650 milliGRAM(s) Oral every 4 hours PRN Temp greater or equal to 38.5C (101.3F)        ROS: all systems reviewed and wnl      PHYSICAL EXAMINATION:  Vital Signs Last 24 Hrs  T(C): 36.2 (10 Apr 2020 06:01), Max: 36.3 (09 Apr 2020 12:29)  T(F): 97.1 (10 Apr 2020 06:01), Max: 97.3 (09 Apr 2020 12:29)  HR: 81 (10 Apr 2020 06:01) (79 - 81)  BP: 155/95 (10 Apr 2020 06:01) (116/66 - 155/95)  BP(mean): --  RR: 19 (09 Apr 2020 12:29) (19 - 19)  SpO2: 98% (10 Apr 2020 06:01) (93% - 100%)  CAPILLARY BLOOD GLUCOSE            GENERAL:   NECK: supple, No JVD  CHEST/LUNG: clear to auscultation bilaterally; no rales, rhonchi, or wheezing b/l  HEART: normal S1, S2  ABDOMEN: BS+, soft, ND, NT   EXTREMITIES:  pulses palpable; no clubbing, cyanosis, or edema b/l LEs  SKIN: no rashes or lesions      LABS:                        14.3   11.05 )-----------( 256      ( 09 Apr 2020 08:19 )             43.6     04-09    137  |  103  |  26<H>  ----------------------------<  189<H>  4.2   |  27  |  0.76    Ca    8.3<L>      09 Apr 2020 08:19 INTERVAL HPI/OVERNIGHT EVENTS:  Pt seen and examined at bedside.     Allergies/Intolerance: No Known Allergies      MEDICATIONS  (STANDING):  atazanavir 300 milliGRAM(s) Oral daily  enoxaparin Injectable 40 milliGRAM(s) SubCutaneous every 12 hours  pantoprazole    Tablet 40 milliGRAM(s) Oral before breakfast    MEDICATIONS  (PRN):  acetaminophen   Tablet .. 650 milliGRAM(s) Oral every 4 hours PRN Temp greater or equal to 38.5C (101.3F)        ROS: all systems reviewed and wnl      PHYSICAL EXAMINATION:  Vital Signs Last 24 Hrs  T(C): 36.2 (10 Apr 2020 06:01), Max: 36.3 (09 Apr 2020 12:29)  T(F): 97.1 (10 Apr 2020 06:01), Max: 97.3 (09 Apr 2020 12:29)  HR: 81 (10 Apr 2020 06:01) (79 - 81)  BP: 155/95 (10 Apr 2020 06:01) (116/66 - 155/95)  BP(mean): --  RR: 19 (09 Apr 2020 12:29) (19 - 19)  SpO2: 98% (10 Apr 2020 06:01) (93% - 100%)  CAPILLARY BLOOD GLUCOSE            GENERAL: stable on NRB, no CP, fevers or SOB on oxygen  NECK: supple, No JVD  CHEST/LUNG: clear to auscultation bilaterally; no rales, rhonchi, or wheezing b/l  HEART: normal S1, S2  ABDOMEN: BS+, soft, ND, NT   EXTREMITIES:  pulses palpable; no clubbing, cyanosis, or edema b/l LEs  SKIN: no rashes or lesions      LABS:                        14.3   11.05 )-----------( 256      ( 09 Apr 2020 08:19 )             43.6     04-09    137  |  103  |  26<H>  ----------------------------<  189<H>  4.2   |  27  |  0.76    Ca    8.3<L>      09 Apr 2020 08:19

## 2020-04-10 NOTE — PROGRESS NOTE ADULT - ASSESSMENT
Patient was presented with cough, fever, shortness of breath and achiness. Patient was diagnosed with acute hypoxic respiratory failure with acute COVID-19 infection and bilateral pneumonia. Patient was admitted and was started on Plaquenil and iv solumedrol. Patient was placed on %.     1.	Acute hypoxic respiratory failue with acute COVID-19 bilateral pneumonia. Still hypoxic requiring NRB oxygen but work of breathing improving. eating well Cont Plaquenil to complete course, added Reyataz 300 mg/day for 4 days. Follow inflammatory markers. Will stop Solumedrol, completed three days. Slowly try to wean off 100 % NRB. Out of bed.  Lovenox DVT prevention 40 mg bid.  Full code. Patient was presented with cough, fever, shortness of breath and achiness. Patient was diagnosed with acute hypoxic respiratory failure with acute COVID-19 infection and bilateral pneumonia. Patient was admitted and was started on Plaquenil and iv solumedrol. Patient was placed on %.     1.	Acute hypoxic respiratory failue with acute COVID-19 bilateral pneumonia. Slowly improving. Eating well. Completed Plaquenil and Reyataz. Completed IV  Solumedrol.    2.	  3.	Slowly try to wean off 100 % NRB. Out of bed.  Lovenox DVT prevention 40 mg bid.  Full code.      Discharge home when fully off oxygen. Patient was presented with cough, fever, shortness of breath and achiness. Patient was diagnosed with acute hypoxic respiratory failure with acute COVID-19 infection and bilateral pneumonia. Patient was admitted and was started on Plaquenil and iv solumedrol. Patient was placed on %.     1.	Acute hypoxic respiratory failue with acute COVID-19 bilateral pneumonia. Slowly improving. Eating well. Completed Plaquenil and Reyataz. Completed IV  Solumedrol.    2.	  3.	Slowly try to wean off 100 % NRB. Out of bed.  Lovenox DVT prevention 40 mg bid.  Full code.      Discharge home when fully off oxygen. Stable for transfer to [facility] today. Called family contact at [phone].     No answer, voice mail box was all filled.

## 2020-04-11 VITALS
HEART RATE: 73 BPM | SYSTOLIC BLOOD PRESSURE: 103 MMHG | RESPIRATION RATE: 18 BRPM | TEMPERATURE: 98 F | OXYGEN SATURATION: 95 % | DIASTOLIC BLOOD PRESSURE: 65 MMHG

## 2020-04-11 LAB — FERRITIN SERPL-MCNC: 621 NG/ML — HIGH (ref 15–150)

## 2020-04-11 RX ADMIN — PANTOPRAZOLE SODIUM 40 MILLIGRAM(S): 20 TABLET, DELAYED RELEASE ORAL at 08:02

## 2020-04-11 RX ADMIN — ENOXAPARIN SODIUM 40 MILLIGRAM(S): 100 INJECTION SUBCUTANEOUS at 05:26

## 2020-04-11 NOTE — PROGRESS NOTE ADULT - SUBJECTIVE AND OBJECTIVE BOX
INTERVAL HPI/OVERNIGHT EVENTS:  Pt seen and examined at bedside.     Allergies/Intolerance: No Known Allergies      MEDICATIONS  (STANDING):  enoxaparin Injectable 40 milliGRAM(s) SubCutaneous every 12 hours  pantoprazole    Tablet 40 milliGRAM(s) Oral before breakfast    MEDICATIONS  (PRN):  acetaminophen   Tablet .. 650 milliGRAM(s) Oral every 4 hours PRN Temp greater or equal to 38.5C (101.3F)        ROS: all systems reviewed and wnl      PHYSICAL EXAMINATION:  Vital Signs Last 24 Hrs  T(C): 36.4 (11 Apr 2020 06:12), Max: 37.1 (10 Apr 2020 18:03)  T(F): 97.5 (11 Apr 2020 06:12), Max: 98.7 (10 Apr 2020 18:03)  HR: 82 (11 Apr 2020 09:27) (67 - 82)  BP: 124/73 (11 Apr 2020 06:12) (124/73 - 145/86)  BP(mean): --  RR: 18 (11 Apr 2020 09:27) (18 - 19)  SpO2: 95% (11 Apr 2020 09:27) (91% - 100%)  CAPILLARY BLOOD GLUCOSE          04-11 @ 07:01  -  04-11 @ 09:47  --------------------------------------------------------  IN: 0 mL / OUT: 500 mL / NET: -500 mL        GENERAL:   NECK: supple, No JVD  CHEST/LUNG: clear to auscultation bilaterally; no rales, rhonchi, or wheezing b/l  HEART: normal S1, S2  ABDOMEN: BS+, soft, ND, NT   EXTREMITIES:  pulses palpable; no clubbing, cyanosis, or edema b/l LEs  SKIN: no rashes or lesions      LABS: INTERVAL HPI/OVERNIGHT EVENTS:  Pt seen and examined at bedside.     Allergies/Intolerance: No Known Allergies      MEDICATIONS  (STANDING):  enoxaparin Injectable 40 milliGRAM(s) SubCutaneous every 12 hours  pantoprazole    Tablet 40 milliGRAM(s) Oral before breakfast    MEDICATIONS  (PRN):  acetaminophen   Tablet .. 650 milliGRAM(s) Oral every 4 hours PRN Temp greater or equal to 38.5C (101.3F)        ROS: all systems reviewed and wnl      PHYSICAL EXAMINATION:  Vital Signs Last 24 Hrs  T(C): 36.4 (11 Apr 2020 06:12), Max: 37.1 (10 Apr 2020 18:03)  T(F): 97.5 (11 Apr 2020 06:12), Max: 98.7 (10 Apr 2020 18:03)  HR: 82 (11 Apr 2020 09:27) (67 - 82)  BP: 124/73 (11 Apr 2020 06:12) (124/73 - 145/86)  BP(mean): --  RR: 18 (11 Apr 2020 09:27) (18 - 19)  SpO2: 95% (11 Apr 2020 09:27) (91% - 100%)  CAPILLARY BLOOD GLUCOSE          04-11 @ 07:01  -  04-11 @ 09:47  --------------------------------------------------------  IN: 0 mL / OUT: 500 mL / NET: -500 mL        GENERAL: stable on oxygen NC, no cough or fevers  NECK: supple, No JVD  CHEST/LUNG: clear to auscultation bilaterally; no rales, rhonchi, or wheezing b/l  HEART: normal S1, S2  ABDOMEN: BS+, soft, ND, NT   EXTREMITIES:  pulses palpable; no clubbing, cyanosis, or edema b/l LEs  SKIN: no rashes or lesions      LABS:

## 2020-04-11 NOTE — DISCHARGE NOTE NURSING/CASE MANAGEMENT/SOCIAL WORK - PATIENT PORTAL LINK FT
You can access the FollowMyHealth Patient Portal offered by [facility] by registering at the following website: [email or web address]. By joining [facility]’s FollowMyHealth portal, you will also be able to view your health information using other applications (apps) compatible with our system.

## 2020-04-11 NOTE — DISCHARGE NOTE NURSING/CASE MANAGEMENT/SOCIAL WORK - NSDCPELOVENOX_GEN_ALL_CORE
Enoxaparin/Lovenox - Follow up monitoring/Enoxaparin/Lovenox - Compliance/Enoxaparin/Lovenox - Potential for adverse drug reactions and interactions/Enoxaparin/Lovenox - Dietary Advice

## 2020-04-11 NOTE — PROGRESS NOTE ADULT - ASSESSMENT
Patient was presented with cough, fever, shortness of breath and achiness. Patient was diagnosed with acute hypoxic respiratory failure with acute COVID-19 infection and bilateral pneumonia. Patient was admitted and was started on Plaquenil and iv solumedrol. Patient was placed on %.     1.	Acute hypoxic respiratory failue with acute COVID-19 bilateral pneumonia. Slowly improving. Eating well. Completed Plaquenil and Reyataz. Completed IV  Solumedrol.    2.	  3.	Slowly try to wean off 100 % NRB. Out of bed.  Lovenox DVT prevention 40 mg bid.  Full code.      Discharge home when fully off oxygen. Stable for transfer to [facility] today. Called family contact at [phone].     No answer, voice mail box was all filled. Patient was presented with cough, fever, shortness of breath and achiness. Patient was diagnosed with acute hypoxic respiratory failure with acute COVID-19 infection and bilateral pneumonia. Patient was admitted and was started on Plaquenil and iv solumedrol. Patient was placed on %.     1.	Acute hypoxic respiratory failue with acute COVID-19 bilateral pneumonia. Slowly improving. Eating well. Completed Plaquenil and Reyataz. Completed IV  Solumedrol.  Stable today again on 5.0 lpm oxygen NC. Lovenox DVT prevention 40 mg bid.  Full code.      Discharge today to [facility]. Patient was presented with cough, fever, shortness of breath and achiness. Patient was diagnosed with acute hypoxic respiratory failure with acute COVID-19 infection and bilateral pneumonia. Patient was admitted and was started on Plaquenil and iv solumedrol. Patient was placed on %.     1.	Acute hypoxic respiratory failue with acute COVID-19 bilateral pneumonia. Slowly improving. Eating well. Completed Plaquenil and Reyataz. Completed IV  Solumedrol.  Stable today again on 5.0 lpm oxygen NC. Lovenox DVT prevention 40 mg bid.  Full code.      Discharge today to [facility]. Called family contact listed in [facility], [phone]. Spoke to daughter, agree to transfer.

## 2020-04-20 DIAGNOSIS — J12.89 OTHER VIRAL PNEUMONIA: ICD-10-CM

## 2020-04-20 DIAGNOSIS — J96.01 ACUTE RESPIRATORY FAILURE WITH HYPOXIA: ICD-10-CM

## 2020-04-20 DIAGNOSIS — E66.9 OBESITY, UNSPECIFIED: ICD-10-CM

## 2020-04-20 DIAGNOSIS — R06.02 SHORTNESS OF BREATH: ICD-10-CM

## 2020-04-23 DIAGNOSIS — U07.1 COVID-19: ICD-10-CM

## 2024-05-13 NOTE — ED PROVIDER NOTE - PRO INTERPRETER NEED 2
General Question     Subject: INQUIRING IF SHE NEEDS A PRE OP.  Patient and /or Facility Request: Arelis Smith   Contact Number: [phone]      [place]